# Patient Record
Sex: FEMALE | Race: WHITE | NOT HISPANIC OR LATINO | Employment: FULL TIME | URBAN - METROPOLITAN AREA
[De-identification: names, ages, dates, MRNs, and addresses within clinical notes are randomized per-mention and may not be internally consistent; named-entity substitution may affect disease eponyms.]

---

## 2019-02-05 ENCOUNTER — TELEPHONE (OUTPATIENT)
Dept: PAIN MEDICINE | Facility: CLINIC | Age: 64
End: 2019-02-05

## 2019-02-05 NOTE — TELEPHONE ENCOUNTER
Patient is calling   [phone]    Patient is calling to schedule an appt with Dr Rosalba Navas  Patient is stating that she did see him in the past  Patient thinks it has only been a couple years  Please advise how to proceed  Patient does not have a new referral  Patient just wants to continue her care with Dr Rosalba Navas  Patient was seen by Dr Kaitlin Garcia at [facility]  Patient is stating that she moved out of the area for a bit & that is why she switched providers  Patient is going to call Dr Hoa Ireland office & ask that they fax her medical records to our office  If they require a medical release, patient will stop in our office to fill one out  Patient is stating that her insurance is [place] PPO is primary & Medicare a & b is secondary

## 2019-02-11 NOTE — TELEPHONE ENCOUNTER
Previous pain records received and reviewed by Dr John Balderrama  Called patient to schedule new patient consult and left a message on voicemail to call and schedule appt  Patient to be scheduled for 30 min consult and new patient paperwork will need to be sent

## 2019-03-25 ENCOUNTER — CONSULT (OUTPATIENT)
Dept: PAIN MEDICINE | Facility: CLINIC | Age: 64
End: 2019-03-25
Payer: COMMERCIAL

## 2019-03-25 VITALS
HEIGHT: 66 IN | WEIGHT: 248 LBS | BODY MASS INDEX: 39.86 KG/M2 | DIASTOLIC BLOOD PRESSURE: 76 MMHG | SYSTOLIC BLOOD PRESSURE: 140 MMHG | HEART RATE: 78 BPM

## 2019-03-25 DIAGNOSIS — M54.16 LUMBAR RADICULOPATHY: ICD-10-CM

## 2019-03-25 DIAGNOSIS — M48.062 SPINAL STENOSIS OF LUMBAR REGION WITH NEUROGENIC CLAUDICATION: Primary | ICD-10-CM

## 2019-03-25 DIAGNOSIS — R26.89 BALANCE DISORDER: ICD-10-CM

## 2019-03-25 PROCEDURE — 99244 OFF/OP CNSLTJ NEW/EST MOD 40: CPT | Performed by: ANESTHESIOLOGY

## 2019-03-25 RX ORDER — OMEGA-3-ACID ETHYL ESTERS 1 G/1
2 CAPSULE, LIQUID FILLED ORAL
COMMUNITY
Start: 2017-06-16 | End: 2019-07-03

## 2019-03-25 RX ORDER — MAGNESIUM 30 MG
30 TABLET ORAL 2 TIMES DAILY
COMMUNITY

## 2019-03-25 RX ORDER — AMPICILLIN TRIHYDRATE 250 MG
CAPSULE ORAL
COMMUNITY

## 2019-03-25 RX ORDER — IRBESARTAN 300 MG/1
300 TABLET ORAL
COMMUNITY

## 2019-03-25 RX ORDER — CELECOXIB 200 MG/1
CAPSULE ORAL
COMMUNITY
Start: 2019-02-04

## 2019-03-25 RX ORDER — OMEGA-3 FATTY ACIDS/FISH OIL 300-1000MG
CAPSULE ORAL
COMMUNITY

## 2019-03-25 NOTE — PROGRESS NOTES
Assessment  1  Spinal stenosis of lumbar region with neurogenic claudication    2  Lumbar radiculopathy    3  Balance disorder        Plan  As mentioned previously Consuelo is a pleasant 49-year-old female who I saw approximately five years ago who was treated for low back and particularly right leg pain  Over the past few years she does have worsening pain down her right leg and now pain down her left leg and balance issues  She moved and underwent a transforaminal epidural steroid injection with Dr Kayla Meehan with no relief  I had a long discussion with the patient at this point I think she would do well with a course of physical therapy lumbar stabilization and strengthening, prescription was provided  We discussed starting other medications such as nerve membrane stabilizing medications however she would not like to take any prescription medications  I mentioned the patient undergo a surgical evaluation but she is opposed to this idea at this time  This point time as she did not do well with a transforaminal epidural steroid injection performed an interlaminar epidural steroid injection to directly address any inflammatory component of the patient's pain  Given the patient's history of diabetes, there may be an increased risk of infection in association with the procedure although the overall risk is low  In addition, following the injection there may be a transient elevation in serum glucose levels for several days  The patient understands that this may require additional glycemic coverage in coordination with the treating physician  We will read group in approximately three weeks after the initial injection for re-evaluation  My impressions and treatment recommendations were discussed in detail with the patient who verbalized understanding and had no further questions  Discharge instructions were provided   I personally saw and examined the patient and I agree with the above discussed plan of care  Orders Placed This Encounter   Procedures    FL spine and pain procedure     Standing Status:   Future     Standing Expiration Date:   3/25/2023     Order Specific Question:   Reason for Exam:     Answer:   LESI to the right 1  Order Specific Question:   Anticoagulant hold needed? Answer:   No    FL spine and pain procedure     Standing Status:   Future     Standing Expiration Date:   3/25/2023     Order Specific Question:   Reason for Exam:     Answer:   LESI to the right 2  Order Specific Question:   Anticoagulant hold needed? Answer:   No    Ambulatory referral to Physical Therapy     Standing Status:   Future     Standing Expiration Date:   9/25/2019     Referral Priority:   Routine     Referral Type:   Physical Therapy     Referral Reason:   Specialty Services Required     Requested Specialty:   Physical Therapy     Number of Visits Requested:   1     Expiration Date:   3/25/2020     New Medications Ordered This Visit   Medications    celecoxib (CeleBREX) 200 mg capsule    Red Yeast Rice 600 MG CAPS     Sig: Take by mouth    omega-3-acid ethyl esters (LOVAZA) 1 g capsule     Sig: Take 2 g by mouth    Ibuprofen (ADVIL) 200 MG CAPS     Sig: Take by mouth    metFORMIN (GLUCOPHAGE) 1000 MG tablet    magnesium 30 MG tablet     Sig: Take 30 mg by mouth 2 (two) times a day    irbesartan (AVAPRO) 300 mg tablet     Sig: Take 300 mg by mouth daily at bedtime       Referred by Dr Tyler Albrecht    History of Present Illness    Kristie Munson is a 75 y o  female who saw approximately five years ago  That point she developed right lower extremity pain had two injections with significant relief  In the interim she has moved saw different pain medicine physician underwent a transforaminal epidural steroid injection with minimal relief  She currently rates her pain as moderate 8/10 on the visual analog scale significant interfering with daily living activities    His pain is intermittent but worse at night describes burning cramping shooting achy with a numbing sensation  She has subjective weakness of her lower limbs  Relaxation decreases symptoms will most activities aggravate them  I have personally reviewed and/or updated the patient's past medical history, past surgical history, family history, social history, current medications, allergies, and vital signs today  Review of Systems   Constitutional: Positive for unexpected weight change  Negative for fever  HENT: Positive for trouble swallowing  Eyes: Negative for visual disturbance  Respiratory: Negative for shortness of breath and wheezing  Cardiovascular: Negative for chest pain and palpitations  Gastrointestinal: Negative for constipation, diarrhea, nausea and vomiting  Endocrine: Negative for cold intolerance, heat intolerance and polydipsia  Genitourinary: Negative for difficulty urinating and frequency  Musculoskeletal: Positive for gait problem (Difficulty walking, Decreased ROM) and joint swelling (joint siffness )  Negative for arthralgias and myalgias  Skin: Negative for rash  Neurological: Positive for weakness  Negative for dizziness, seizures, syncope and headaches  Hematological: Does not bruise/bleed easily  Psychiatric/Behavioral: Negative for dysphoric mood  All other systems reviewed and are negative  Patient Active Problem List   Diagnosis    Chronic pain disorder       Past Medical History:   Diagnosis Date    Anxiety     Depression     Diabetes ([facility] [address] )        Past Surgical History:   Procedure Laterality Date    ECTOPIC PREGNANCY SURGERY         History reviewed  No pertinent family history      Social History     Occupational History    Not on file   Tobacco Use    Smoking status: Never Smoker    Smokeless tobacco: Never Used   Substance and Sexual Activity    Alcohol use: Never     Frequency: Never    Drug use: Never    Sexual activity: Not Currently       Current Outpatient Medications on File Prior to Visit   Medication Sig    celecoxib (CeleBREX) 200 mg capsule     Ibuprofen (ADVIL) 200 MG CAPS Take by mouth    irbesartan (AVAPRO) 300 mg tablet Take 300 mg by mouth daily at bedtime    magnesium 30 MG tablet Take 30 mg by mouth 2 (two) times a day    metFORMIN (GLUCOPHAGE) 1000 MG tablet     omega-3-acid ethyl esters (LOVAZA) 1 g capsule Take 2 g by mouth    Red Yeast Rice 600 MG CAPS Take by mouth     No current facility-administered medications on file prior to visit  Allergies   Allergen Reactions    Tramadol        Physical Exam    /76   Pulse 78   Ht 5' 6" (1 676 m)   Wt 112 kg (248 lb)   BMI 40 03 kg/m²     Constitutional: normal, well developed, well nourished, alert, in no distress and non-toxic and no overt pain behavior  and obese  Eyes: anicteric  HEENT: grossly intact  Neck: supple, symmetric, trachea midline and no masses   Pulmonary:even and unlabored  Cardiovascular:No edema or pitting edema present  Skin:Normal without rashes or lesions and well hydrated  Psychiatric:Mood and affect appropriate  Neurologic:Cranial Nerves II-XII grossly intact  Musculoskeletal:normal and antalgic, difficulty going from sitting to standing sitting position, no obvious skin lesions or erythema lumbar sacral spine, there is mild tenderness on the right PSIS negative the left no greater trochanteric tenderness bilateral, deep tendon reflexes are mute did but symmetrical patella and Achilles, decreased sensation in the right L4-5 distribution to pinwheel compared her left no other sensory deficits are appreciated, no focal motor deficit appreciated lower limbs negative bilateral straight leg raising    Imaging      05/01/18 MR lumbar spine wo con          Indication: M54 5, low back pain  Comparison: Lumbar spine MRI dated 4/15/2014 and pelvic ultrasound dated 3/6/2014  Technique: MR imaging of the lumbar spine was performed without contrast   Findings:  The last fully formed disc space is considered L5-S1  There is normal lumbar lordosis  No listhesis  Vertebral body heights are maintained  Multilevel degenerative endplate changes are present  The conus terminates at the L1 level  The distal cord is normal in size and signal characteristics  There is multilevel disc desiccation and intervertebral disc space narrowing  T12-L1: Moderate-sized central/left paracentral disc extrusion with mild inferior migration  Mild central canal narrowing  No foraminal narrowing  These findings are not significantly changed from 2014  L1-L2: Disc bulge with a small superimposed central disc extrusion with mild inferior migration  Mild central canal narrowing  Mild bilateral foraminal narrowing  No significant change from 2014  L2-L3: Disc bulge, asymmetric to the left  Prominent posterior epidural fat  Severe central canal narrowing  Mild right and moderate left foraminal narrowing  These findings have progressed from 2014  L3-L4: Disc bulge with small overlying endplate osteophytes  Prominent posterior epidural fat  Moderate central canal narrowing  Moderate bilateral foraminal narrowing  These findings have progressed from 2014  L4-L5: Disc bulge with overlying endplate osteophytes, asymmetric to the right  Moderate bilateral facet hypertrophy  Prominent posterior epidural fat  Moderate central canal narrowing  Moderate bilateral foraminal narrowing  These findings are similar to 2014  L5-S1: Disc bulge with overlying endplate osteophytes, asymmetric to the right  Mild bilateral facet hypertrophy  Mild central canal narrowing  Moderate right and mild left foraminal narrowing  These findings are similar to 2014  An incompletely visualized ovoid cystic lesion is present in the pelvis, also present on the 4/15/2014 lumbar spine MRI and corresponding to right ovarian cysts on the pelvic ultrasound dated 3/6/2014    Impression: Multilevel degenerative changes in the lumbar spine, worst at L2-L3 where there is severe central canal narrowing  Please see details above  CERV  SPINE  4  OR  5  VIEWS              04/30/14      Category:    Radiology-General      Indication:  Right-sided  neck  pain        Findings:  6  views  of  the  cervical  spine  were  performed  There  is  straightening  of  the  cervical    spine  The  cervical  vertebral  bodies  maintain  normal  height,  morphology  and  alignment  Disc  spaces  are  grossly  maintained,  however  there  are  anterior  bridging  osteophytes  at  all  levels  The    posterior  elements  are  intact  The  C1-2  articulation  is  intact  The  neural  foramina  are  grossly    normal  in  caliber  at  all  levels  Impression:  Anterior  bridging  osteophytes  at  all  levels  of  the  cervical  spine  This  may  represent    degenerative  change,  however  as  disc  spaces  are  preserved,  this  may  also  represent  a  process  such  as  DISH  MRI  LUMBAR  SP  W/O  IV  CONTRAST             04/15/14      Indication:  Low  back  and  right  leg  pain        Comparison:  November 29, 2011        Findings:  Multiplanar  MR  examination  of  the  lumbar  spine  performed  The  termination  of  the  conus  medullaris  is  at  the  level  of  T12-L1  Persistent  cysts  within  the  pelvis  T12-L1  interspace:  Broad-based  central  disc  herniation,  unchanged  since  November 29, 2011  L1-L2  interspace:  Degenerative  disc  disease  and  central  disc  herniation  The  herniation  is    slightly  larger  than  on  the  examination  of  2011  Broad-based  impression  on  the  thecal  sac  L2-L3  interspace: There  is  degenerative  disc  disease  and  a  central  disc  herniation  There  is  a    left  paracentral  and  lateral  component  occupying  the  inferior  percent  of  the  left  neural  foramen  Findings  are  unchanged  since  the  prior  study  L3-L4  interspace:   There is  degenerative  disc  disease  There  is  a  broad-based  central  disc    herniation  Broad-based  impression  on  the  thecal  sac  Bilateral  degenerative  facet  disease  and    ligamentum  flavum  thickening  L4-L5  interspace:  Degenerative  disc  disease  and  a  broad-based  central  disc  herniation  is  present  There  is  broad-based  impression  on  the  ventral  thecal  sac  There  is  a  right  lateral  component    occupying  the  inferior  petrosal  of  the  right  neural  foramen  This  is  causing  mass  effect  on  the    exiting  right  L4  nerve  root  Findings  similar  to  the  examination  of  2011  There  is  right  lateral    recess  stenosis  L5-S1  interspace: The  disc  is  well  maintained  There  is  no  disc  bulging  or  herniation  The  spinal    canal  is  of  normal  dimensions  The  neural  foramina  are  patent  Conclusion:          1  Multilevel  degenerative  disc  disease  and  multilevel  central  disc  herniations  2   There  is  a  right  paracentral  and  right  lateral  HNP  at  the  L4-L5  level  causing  mass  effect  on    the  exiting  right  L4  nerve  root  3   Left  paracentral/lateral  herniation  at  the  L2-L3  level  causing  stenosis  of  the  left  neural    foramen  This  finding  is  unchanged  4   Persistent  right  ovarian  cysts          I have personally reviewed pertinent films in PACS

## 2019-04-08 ENCOUNTER — TELEPHONE (OUTPATIENT)
Dept: PAIN MEDICINE | Facility: MEDICAL CENTER | Age: 64
End: 2019-04-08

## 2019-04-19 ENCOUNTER — HOSPITAL ENCOUNTER (OUTPATIENT)
Dept: RADIOLOGY | Facility: CLINIC | Age: 64
Discharge: HOME | End: 2019-04-19
Attending: ANESTHESIOLOGY
Payer: MEDICARE

## 2019-04-19 VITALS
RESPIRATION RATE: 20 BRPM | DIASTOLIC BLOOD PRESSURE: 80 MMHG | TEMPERATURE: 98.1 F | HEART RATE: 79 BPM | SYSTOLIC BLOOD PRESSURE: 143 MMHG | OXYGEN SATURATION: 93 %

## 2019-04-19 DIAGNOSIS — M54.16 LUMBAR RADICULOPATHY: ICD-10-CM

## 2019-04-19 DIAGNOSIS — M48.062 SPINAL STENOSIS OF LUMBAR REGION WITH NEUROGENIC CLAUDICATION: ICD-10-CM

## 2019-04-19 PROCEDURE — 62323 NJX INTERLAMINAR LMBR/SAC: CPT | Performed by: ANESTHESIOLOGY

## 2019-04-19 RX ORDER — METHYLPREDNISOLONE ACETATE 80 MG/ML
160 INJECTION, SUSPENSION INTRA-ARTICULAR; INTRALESIONAL; INTRAMUSCULAR; PARENTERAL; SOFT TISSUE ONCE
Status: COMPLETED | OUTPATIENT
Start: 2019-04-19 | End: 2019-04-19

## 2019-04-19 RX ORDER — LIDOCAINE HYDROCHLORIDE 10 MG/ML
5 INJECTION, SOLUTION EPIDURAL; INFILTRATION; INTRACAUDAL; PERINEURAL ONCE
Status: COMPLETED | OUTPATIENT
Start: 2019-04-19 | End: 2019-04-19

## 2019-04-19 RX ADMIN — METHYLPREDNISOLONE ACETATE 160 MG: 80 INJECTION, SUSPENSION INTRA-ARTICULAR; INTRALESIONAL; INTRAMUSCULAR; SOFT TISSUE at 09:02

## 2019-04-19 RX ADMIN — IOHEXOL 1 ML: 300 INJECTION, SOLUTION INTRAVENOUS at 09:01

## 2019-04-19 RX ADMIN — LIDOCAINE HYDROCHLORIDE 5 ML: 10 INJECTION, SOLUTION EPIDURAL; INFILTRATION; INTRACAUDAL; PERINEURAL at 09:01

## 2019-04-26 ENCOUNTER — TELEPHONE (OUTPATIENT)
Dept: PAIN MEDICINE | Facility: CLINIC | Age: 64
End: 2019-04-26

## 2019-05-02 ENCOUNTER — TELEPHONE (OUTPATIENT)
Dept: PAIN MEDICINE | Facility: CLINIC | Age: 64
End: 2019-05-02

## 2019-05-03 ENCOUNTER — HOSPITAL ENCOUNTER (OUTPATIENT)
Dept: RADIOLOGY | Facility: CLINIC | Age: 64
Discharge: HOME | End: 2019-05-03
Attending: ANESTHESIOLOGY
Payer: MEDICARE

## 2019-05-03 VITALS
TEMPERATURE: 98.2 F | HEART RATE: 75 BPM | RESPIRATION RATE: 18 BRPM | DIASTOLIC BLOOD PRESSURE: 78 MMHG | SYSTOLIC BLOOD PRESSURE: 136 MMHG | OXYGEN SATURATION: 94 %

## 2019-05-03 DIAGNOSIS — M48.062 SPINAL STENOSIS OF LUMBAR REGION WITH NEUROGENIC CLAUDICATION: ICD-10-CM

## 2019-05-03 DIAGNOSIS — M54.16 LUMBAR RADICULOPATHY: ICD-10-CM

## 2019-05-03 PROCEDURE — 62323 NJX INTERLAMINAR LMBR/SAC: CPT | Performed by: ANESTHESIOLOGY

## 2019-05-03 RX ORDER — METHYLPREDNISOLONE ACETATE 80 MG/ML
160 INJECTION, SUSPENSION INTRA-ARTICULAR; INTRALESIONAL; INTRAMUSCULAR; PARENTERAL; SOFT TISSUE ONCE
Status: COMPLETED | OUTPATIENT
Start: 2019-05-03 | End: 2019-05-03

## 2019-05-03 RX ORDER — LIDOCAINE HYDROCHLORIDE 10 MG/ML
5 INJECTION, SOLUTION EPIDURAL; INFILTRATION; INTRACAUDAL; PERINEURAL ONCE
Status: COMPLETED | OUTPATIENT
Start: 2019-05-03 | End: 2019-05-03

## 2019-05-03 RX ADMIN — METHYLPREDNISOLONE ACETATE 160 MG: 80 INJECTION, SUSPENSION INTRA-ARTICULAR; INTRALESIONAL; INTRAMUSCULAR; SOFT TISSUE at 08:59

## 2019-05-03 RX ADMIN — LIDOCAINE HYDROCHLORIDE 3 ML: 10 INJECTION, SOLUTION EPIDURAL; INFILTRATION; INTRACAUDAL; PERINEURAL at 08:57

## 2019-05-03 RX ADMIN — IOHEXOL 1 ML: 300 INJECTION, SOLUTION INTRAVENOUS at 08:59

## 2019-05-10 ENCOUNTER — TELEPHONE (OUTPATIENT)
Dept: PAIN MEDICINE | Facility: CLINIC | Age: 64
End: 2019-05-10

## 2019-07-03 ENCOUNTER — OFFICE VISIT (OUTPATIENT)
Dept: PAIN MEDICINE | Facility: CLINIC | Age: 64
End: 2019-07-03
Payer: MEDICARE

## 2019-07-03 VITALS
HEIGHT: 66 IN | SYSTOLIC BLOOD PRESSURE: 140 MMHG | BODY MASS INDEX: 40.18 KG/M2 | WEIGHT: 250 LBS | DIASTOLIC BLOOD PRESSURE: 82 MMHG

## 2019-07-03 DIAGNOSIS — M54.16 LUMBAR RADICULOPATHY: ICD-10-CM

## 2019-07-03 DIAGNOSIS — M48.062 SPINAL STENOSIS OF LUMBAR REGION WITH NEUROGENIC CLAUDICATION: ICD-10-CM

## 2019-07-03 DIAGNOSIS — G89.29 CHRONIC LEFT-SIDED LOW BACK PAIN WITHOUT SCIATICA: ICD-10-CM

## 2019-07-03 DIAGNOSIS — G89.4 CHRONIC PAIN SYNDROME: Primary | ICD-10-CM

## 2019-07-03 DIAGNOSIS — M54.50 CHRONIC LEFT-SIDED LOW BACK PAIN WITHOUT SCIATICA: ICD-10-CM

## 2019-07-03 PROCEDURE — 99214 OFFICE O/P EST MOD 30 MIN: CPT | Performed by: NURSE PRACTITIONER

## 2019-07-03 RX ORDER — ACETAMINOPHEN 500 MG
500 TABLET ORAL EVERY 6 HOURS PRN
COMMUNITY

## 2019-07-03 NOTE — PATIENT INSTRUCTIONS
Epidural Steroid Injection, Ambulatory Care   GENERAL INFORMATION:   What do I need to know about an epidural steroid injection? An epidural steroid injection (AMI) is a procedure to inject steroid medicine into the epidural space  The epidural space is between your spinal cord and vertebrae  Steroids reduce inflammation and fluid buildup in your spine that may be causing pain  You may be given pain medicine along with the steroids  How do I prepare for an AMI? Your healthcare provider will talk to you about how to prepare for your procedure  He will tell you what medicines to take or not take on the day of your procedure  You may need to stop taking blood thinners or other medicines several days before your procedure  You may need to adjust any diabetes medicine you take on the day of your procedure  Steroid medicine can increase your blood sugar level  What will happen during an AMI? · You will be given medicine to numb the procedure area  You will be awake for the procedure, but you will not feel pain  You may also be given medicine to help you relax during the procedure  Contrast liquid will be used to help your healthcare provider see the area better  Tell the healthcare provider if you have ever had an allergic reaction to contrast liquid  · Your healthcare provider may place the needle into your neck area, middle of your back, or tailbone area  He may inject the medicine next to the nerves that are causing your pain  He may instead inject the medicine into a larger area of the epidural space  This helps the medicine spread to more nerves  Your healthcare provider will use a fluoroscope to help guide the needle to the right place  A fluoroscope is a type of x-ray  After the procedure, a bandage will be placed over the injection site to prevent infection  What are the risks of an AMI? You may have temporary or permanent nerve damage or paralysis   You may have bleeding or develop a serious infection, such as meningitis (swelling of the brain coverings)  An abscess may also develop  You may need surgery to fix the abscess  You may have a seizure, anxiety, or trouble sleeping  If you are a man, you may have temporary erectile dysfunction (not able to have an erection)  CARE AGREEMENT:   You have the right to help plan your care  Learn about your health condition and how it may be treated  Discuss treatment options with your caregivers to decide what care you want to receive  You always have the right to refuse treatment  The above information is an  only  It is not intended as medical advice for individual conditions or treatments  Talk to your doctor, nurse or pharmacist before following any medical regimen to see if it is safe and effective for you  © 2014 [address] is for End User's use only and may not be sold, redistributed or otherwise used for commercial purposes  All illustrations and images included in CareNotes® are the copyrighted property of A D A M , [employer]  or Delroy Alicea

## 2019-07-03 NOTE — PROGRESS NOTES
Assessment:  1  Chronic pain syndrome    2  Chronic left-sided low back pain without sciatica    3  Lumbar radiculopathy    4  Spinal stenosis of lumbar region with neurogenic claudication        Plan:  While the patient was in the office today, I did have a thorough conversation with the patient regarding her chronic pain syndrome, symptoms, and treatment plan options  I explained the patient that at this point with her current symptoms and MRI findings, I feel would be in her best interest to proceed with a left L5 and S1 transforaminal epidural steroid injection with Dr Tamia Guzman for both diagnostic, and hopefully therapeutic purposes  The patient was agreeable and verbalized an understanding  Complete risks and benefits including bleeding, infection, tissue reaction, nerve injury and allergic reaction were discussed  The approach was demonstrated using models and literature was provided  Verbal and written consent was obtained  We also discussed the possibility of proceeding with a neuropathic medication such as Cymbalta since the patient has previously tried and failed gabapentin  However, the patient is very leery about trying another neuropathic medication and for now she would like to proceed with a repeat injection 1st and see how she does and if the injection does not help, she may then consider the Cymbalta in the future  The patient reports that her ultimate goal is to avoid surgery at all costs  I discussed with the patient that at this point time she can followup with our office on an as-needed basis  I did review the patient that if her pain symptoms should change, worsen, and/or if she would experience any new symptoms as she would like to be evaluated for, she should give our office a call  The patient was agreeable and verbalized an understanding  History of Present Illness:     The patient is a 75 y o  female last seen on 5/3/19 who presents for a follow up office visit in regards to chronic pain syndrome secondary to lumbar stenosis with radiculopathy  The patient currently reports that since her last office visit her right-sided low back and leg pain has improved as a result of the 2nd L5-S1 interlaminar lumbar epidural steroid injection directed towards the right, however, the left sided low back pain and a left L5-S1 dermatome distribution has continued to worsen, especially the pain going down the back of her leg  She presents today as she was hoping we can maybe consider a repeat epidural steroid injection directed towards the left side  I have personally reviewed and/or updated the patient's past medical history, past surgical history, family history, social history, current medications, allergies, and vital signs today  Review of Systems:    Review of Systems   Respiratory: Negative for shortness of breath  Cardiovascular: Negative for chest pain  Gastrointestinal: Positive for diarrhea  Negative for constipation, nausea and vomiting  Musculoskeletal: Positive for gait problem  Negative for arthralgias, joint swelling (joint stiffness) and myalgias  Skin: Negative for rash  Neurological: Positive for weakness  Negative for dizziness and seizures  All other systems reviewed and are negative  Past Medical History:   Diagnosis Date    Anxiety     Depression     Diabetes [facility])        Past Surgical History:   Procedure Laterality Date    ECTOPIC PREGNANCY SURGERY         History reviewed  No pertinent family history      Social History     Occupational History    Not on file   Tobacco Use    Smoking status: Never Smoker    Smokeless tobacco: Never Used   Substance and Sexual Activity    Alcohol use: Never     Frequency: Never    Drug use: Never    Sexual activity: Not Currently         Current Outpatient Medications:     acetaminophen (TYLENOL) 500 mg tablet, Take 500 mg by mouth every 6 (six) hours as needed for mild pain, Disp: , Rfl:    celecoxib (CeleBREX) 200 mg capsule, , Disp: , Rfl:     Ibuprofen (ADVIL) 200 MG CAPS, Take by mouth, Disp: , Rfl:     irbesartan (AVAPRO) 300 mg tablet, Take 300 mg by mouth daily at bedtime, Disp: , Rfl:     magnesium 30 MG tablet, Take 30 mg by mouth 2 (two) times a day, Disp: , Rfl:     metFORMIN (GLUCOPHAGE) 1000 MG tablet, , Disp: , Rfl:     Red Yeast Rice 600 MG CAPS, Take by mouth, Disp: , Rfl:     Allergies   Allergen Reactions    Tramadol        Physical Exam:    /82   Ht 5' 6" (1 676 m)   Wt 113 kg (250 lb)   BMI 40 35 kg/m²     Constitutional:normal, well developed, well nourished, alert, in no distress and non-toxic and no overt pain behavior  and overweight  Eyes:anicteric  HEENT:grossly intact  Neck:supple, symmetric, trachea midline and no masses   Pulmonary:even and unlabored  Cardiovascular:No edema or pitting edema present  Skin:Normal without rashes or lesions and well hydrated  Psychiatric:Mood and affect appropriate  Neurologic:Cranial Nerves II-XII grossly intact  Musculoskeletal:The patient's gait was slightly antalgic, but steady without the use of any assistive devices        Imaging  FL spine and pain procedure    (Results Pending)         Orders Placed This Encounter   Procedures    FL spine and pain procedure

## 2019-08-05 ENCOUNTER — TELEPHONE (OUTPATIENT)
Dept: PAIN MEDICINE | Facility: MEDICAL CENTER | Age: 64
End: 2019-08-05

## 2019-08-05 NOTE — TELEPHONE ENCOUNTER
Pt called and stated due to scheduling conflict, she needs to reschedule her procedure  Procedure is schedule 8/12/2019  Pt is available after labor day  1st week of September      Pt can be reached at [phone]

## 2021-04-20 ENCOUNTER — IMMUNIZATIONS (OUTPATIENT)
Dept: FAMILY MEDICINE CLINIC | Facility: HOSPITAL | Age: 66
End: 2021-04-20

## 2021-04-20 DIAGNOSIS — Z23 ENCOUNTER FOR IMMUNIZATION: Primary | ICD-10-CM

## 2021-04-20 PROCEDURE — 91300 SARS-COV-2 / COVID-19 MRNA VACCINE (PFIZER-BIONTECH) 30 MCG: CPT

## 2021-04-20 PROCEDURE — 0001A SARS-COV-2 / COVID-19 MRNA VACCINE (PFIZER-BIONTECH) 30 MCG: CPT

## 2021-05-14 ENCOUNTER — IMMUNIZATIONS (OUTPATIENT)
Dept: FAMILY MEDICINE CLINIC | Facility: HOSPITAL | Age: 66
End: 2021-05-14

## 2021-05-14 DIAGNOSIS — Z23 ENCOUNTER FOR IMMUNIZATION: Primary | ICD-10-CM

## 2021-05-14 PROCEDURE — 0002A SARS-COV-2 / COVID-19 MRNA VACCINE (PFIZER-BIONTECH) 30 MCG: CPT

## 2021-05-14 PROCEDURE — 91300 SARS-COV-2 / COVID-19 MRNA VACCINE (PFIZER-BIONTECH) 30 MCG: CPT

## 2021-10-11 ENCOUNTER — TELEPHONE (OUTPATIENT)
Dept: PAIN MEDICINE | Facility: CLINIC | Age: 66
End: 2021-10-11

## 2021-10-13 ENCOUNTER — TELEPHONE (OUTPATIENT)
Dept: PAIN MEDICINE | Facility: CLINIC | Age: 66
End: 2021-10-13